# Patient Record
Sex: FEMALE | Race: WHITE | NOT HISPANIC OR LATINO | Employment: OTHER | URBAN - METROPOLITAN AREA
[De-identification: names, ages, dates, MRNs, and addresses within clinical notes are randomized per-mention and may not be internally consistent; named-entity substitution may affect disease eponyms.]

---

## 2017-01-04 ENCOUNTER — OFFICE VISIT (OUTPATIENT)
Dept: CARDIOLOGY | Facility: CLINIC | Age: 77
End: 2017-01-04

## 2017-01-04 VITALS
HEIGHT: 62 IN | DIASTOLIC BLOOD PRESSURE: 72 MMHG | HEART RATE: 50 BPM | BODY MASS INDEX: 33.31 KG/M2 | WEIGHT: 181 LBS | SYSTOLIC BLOOD PRESSURE: 148 MMHG

## 2017-01-04 DIAGNOSIS — R06.09 EXERTIONAL DYSPNEA: Primary | ICD-10-CM

## 2017-01-04 DIAGNOSIS — E78.5 HYPERLIPIDEMIA LDL GOAL <100: ICD-10-CM

## 2017-01-04 DIAGNOSIS — I10 ESSENTIAL HYPERTENSION: ICD-10-CM

## 2017-01-04 PROBLEM — R00.1 BRADYCARDIA: Status: ACTIVE | Noted: 2017-01-04

## 2017-01-04 PROCEDURE — 99213 OFFICE O/P EST LOW 20 MIN: CPT | Performed by: INTERNAL MEDICINE

## 2017-01-04 PROCEDURE — 93000 ELECTROCARDIOGRAM COMPLETE: CPT | Performed by: INTERNAL MEDICINE

## 2017-01-04 NOTE — PROGRESS NOTES
"Charo HURTADO Jose  1940  [phone]      01/04/2017    KURT Shaw MD    Chief Complaint   Patient presents with   • Hypertension   • Shortness of Breath     Problem List:   1. Exertional Dyspnea  1. Echocardiogram, 10/13/2016: Estimated EF = 60%. Mild aortic valve regurgitation is present. AV sclerosis no stenosis. Mild mitral valve regurgitation is present. RVSP(TR) 22.6 mmHg.   2. Gastroesophageal Reflux Disease Without Esophagitis  3. Essential Hypertension  4. Hyperlipidemia Ldl Goal <100  5. Surgeries:  1. Right cataract excision      Allergies   Allergen Reactions   • Aspirin        Current Medications:      Current Outpatient Prescriptions:   •  atorvastatin (LIPITOR) 20 MG tablet, Take 20 mg by mouth Daily., Disp: , Rfl:   •  Calcium Carbonate (CALCIUM 600 PO), Take  by mouth 2 (Two) Times a Day., Disp: , Rfl:   •  conjugated estrogens (PREMARIN) 0.625 MG/GM vaginal cream, Insert  into the vagina 2 (Two) Times a Week., Disp: , Rfl:   •  omeprazole (PriLOSEC) 20 MG capsule, Take 20 mg by mouth Daily., Disp: , Rfl:   •  polyethylene glycol (MIRALAX) packet, Take 17 g by mouth Daily. 1/2 capful, Disp: , Rfl:   •  talc (ZEASORB) powder, Apply  topically As Needed for irritation., Disp: , Rfl:   •  verapamil SR (CALAN-SR) 240 MG CR tablet, 2 (Two) Times a Day., Disp: , Rfl:     HPI    Patient is a 76 year old  female who presents today for follow up for her shortness of breath. Since last visit, she has not noticed any change in her symptoms; however, she did not ever start the Maxzide because she's afraid it will \"keep her home\" too much in the bathroom.  Her biggest concern is that of her heart rate gradually getting lower over time.  She had been at 62 for several years, 3 months ago she was at 56, and today her heart rate was at 50.  She admits she does not feel any differently, but feels this should be addressed accordingly before it gets any lower. She does admit to bilateral lower extremity " "edema that gets worse as the day progresses, but resolves over night.  Patient denies chest pain, palpitations, shortness of breath, PND, orthopnea, dizziness, and syncope. She is able to carry loads of laundry up and down the stairs without becoming symptomatic, but has not real exercise regimen.     The following portions of the patient's history were reviewed and updated as appropriate: allergies, current medications and problem list.    Pertinent positives as listed in the HPI.  All other systems reviewed are negative.    Vitals:    01/04/17 1029   BP: 148/72   BP Location: Right arm   Patient Position: Sitting   Pulse: 50   Weight: 181 lb (82.1 kg)   Height: 62\" (157.5 cm)       General: Alert and oriented  Neck: Jugular venous pressure is within normal limits. Carotids have normal upstrokes without bruits.   Cardiovascular: Heart has a nondisplaced focal PMI. Regular rate and rhythm without murmur, gallop or rub.  Lungs: Clear without rales or wheezes. Equal expansion is noted.   Extremities: Show no edema.  Skin: warm and dry.  Neurologic: nonfocal      Diagnostic Data:          ECG 12 Lead  Date/Time: 1/4/2017 11:10 AM  Performed by: ADDIE DOWNEY  Authorized by: ADDIE DOWNEY   Rhythm: sinus bradycardia  Rate: bradycardic  BPM: 53  Clinical impression: abnormal ECG  Comments: Cannot rule out anterior infarct, age undetermined            Assessment:      ICD-10-CM ICD-9-CM   1. Exertional dyspnea R06.09 786.09   2. Essential hypertension I10 401.9   3. Hyperlipidemia LDL goal <100 E78.5 272.4       Plan:    1. Start taking Maxzide 1/2 tab prn for edema.  If she should start taking it on a regular basis, she was instructed to call to get a lab order for a BMP.  She verbalized understanding  2. Cut Verapamil to daily dose instead of BID to see if HR improves.   3. Call in 2 weeks with BP and HR readings.    4. Continue current medications.  5. F/up in 3 months or sooner if needed.    Scribed for Sarah HICKS" MD Rena by Sherrie Suarez, LAVELL. 1/4/2017  10:44 AM        I, Sarah Chase MD, personally performed the services described in this documentation as scribed by the above named individual in my presence, and it is both accurate and complete.  1/4/2017  4:17 PM

## 2017-01-04 NOTE — MR AVS SNAPSHOT
Charo R Jose   1/4/2017 10:30 AM   Office Visit    Dept Phone:  [phone]   Encounter #:  [id number]    Provider:  Sarah Chase MD   Department:  [facility] CARDIOLOGY                Your Full Care Plan              Today's Medication Changes          These changes are accurate as of: 1/4/17 11:09 AM.  If you have any questions, ask your nurse or doctor.               Stop taking medication(s)listed here:     triamterene-hydrochlorothiazide 37.5-25 MG per tablet   Commonly known as:  MAXZIDE-25   Stopped by:  Sarah Chase MD                      Your Updated Medication List          This list is accurate as of: 1/4/17 11:09 AM.  Always use your most recent med list.                atorvastatin 20 MG tablet   Commonly known as:  LIPITOR       CALCIUM 600 PO       omeprazole 20 MG capsule   Commonly known as:  priLOSEC       polyethylene glycol packet   Commonly known as:  MIRALAX       PREMARIN 0.625 MG/GM vaginal cream   Generic drug:  conjugated estrogens       verapamil  MG CR tablet   Commonly known as:  CALAN-SR       ZEASORB powder   Generic drug:  talc               You Were Diagnosed With        Codes Comments    Exertional dyspnea    -  Primary ICD-10-CM: R06.09  ICD-9-CM: 786.09     Essential hypertension     ICD-10-CM: I10  ICD-9-CM: 401.9     Hyperlipidemia LDL goal <100     ICD-10-CM: E78.5  ICD-9-CM: 272.4       Instructions     None    Patient Instructions History      Upcoming Appointments     Visit Type Date Time Department    FOLLOW UP 1/4/2017 10:30 AM [facility]    FOLLOW UP 4/27/2017  4:15 PM [facility]      MyChart Signup     [facility] [employer] allows you to send messages to your doctor, view your test results, renew your prescriptions, schedule appointments, and more. To sign up, go to scroll kit and click on the Sign Up Now link in the New User? box. Enter your [employer]  "Activation Code exactly as it appears below along with the last four digits of your Social Security Number and your Date of Birth () to complete the sign-up process. If you do not sign up before the expiration date, you must request a new code.    [employer] Activation Code: [email or web address]  Expires: 2017 11:08 AM    If you have questions, you can email [email or web address] or call [phone] to talk to our [employer] staff. Remember, [employer] is NOT to be used for urgent needs. For medical emergencies, dial 911.               Other Info from Your Visit           Your Appointments     2017  4:15 PM EDT   Follow Up with Sarah Chase MD   [facility] MEDICAL [facility] CARDIOLOGY (--)    [address] [place] [id number] [phone]           Arrive 15 minutes prior to appointment.              Allergies     Aspirin        Reason for Visit     Hypertension     Shortness of Breath           Vital Signs     Blood Pressure Pulse Height Weight Body Mass Index Smoking Status    148/72 (BP Location: Right arm, Patient Position: Sitting) 50 62\" (157.5 cm) 181 lb (82.1 kg) 33.11 kg/m2 Never Smoker      Problems and Diagnoses Noted     Bradycardia    High blood pressure    Exertional dyspnea    Hyperlipidemia LDL goal <100        "

## 2017-01-19 ENCOUNTER — TELEPHONE (OUTPATIENT)
Dept: CARDIOLOGY | Facility: CLINIC | Age: 77
End: 2017-01-19

## 2017-01-20 DIAGNOSIS — Z79.899 HIGH RISK MEDICATION USE: Primary | ICD-10-CM

## 2017-01-20 NOTE — TELEPHONE ENCOUNTER
I spoke with the PT in regards to DR. Chase suggestion on monitoring BP and HR- she verbalized understanding- she will call me in 1-2 weeks with an update- she will me back sooner if she has any problems in the next week while on her trip-

## 2017-01-31 ENCOUNTER — LAB (OUTPATIENT)
Dept: LAB | Facility: HOSPITAL | Age: 77
End: 2017-01-31

## 2017-01-31 DIAGNOSIS — Z79.899 HIGH RISK MEDICATION USE: ICD-10-CM

## 2017-01-31 LAB
ANION GAP SERPL CALCULATED.3IONS-SCNC: 3 MMOL/L (ref 3–11)
BUN BLD-MCNC: 19 MG/DL (ref 9–23)
BUN/CREAT SERPL: 21.1 (ref 7–25)
CALCIUM SPEC-SCNC: 11.2 MG/DL (ref 8.7–10.4)
CHLORIDE SERPL-SCNC: 105 MMOL/L (ref 99–109)
CO2 SERPL-SCNC: 33 MMOL/L (ref 20–31)
CREAT BLD-MCNC: 0.9 MG/DL (ref 0.6–1.3)
GFR SERPL CREATININE-BSD FRML MDRD: 61 ML/MIN/1.73
GLUCOSE BLD-MCNC: 97 MG/DL (ref 70–100)
POTASSIUM BLD-SCNC: 4.5 MMOL/L (ref 3.5–5.5)
SODIUM BLD-SCNC: 141 MMOL/L (ref 132–146)

## 2017-01-31 PROCEDURE — 80048 BASIC METABOLIC PNL TOTAL CA: CPT | Performed by: INTERNAL MEDICINE

## 2017-02-01 NOTE — TELEPHONE ENCOUNTER
The patient called to report the requested BP log.  The majority of the time, both morning and evening, BP is between 120-138/62-79 with HR ranging from 62-75.  However, in the evening on 4 occasions her BP has elevated to 150-160/68-90 with HR in the 40's.  She also states that the prn 0.5 tablet of triamterene in the morning has significantly improved her edema, but in the evenings she is noticing the edema is sometimes returning.  I advised that she take the other half of her triamterene in the evening for edema or SBP >150.  She verbalized understanding and states she will let us know if she has had any further questions or concerns.

## 2017-04-27 ENCOUNTER — OFFICE VISIT (OUTPATIENT)
Dept: CARDIOLOGY | Facility: CLINIC | Age: 77
End: 2017-04-27

## 2017-04-27 VITALS
DIASTOLIC BLOOD PRESSURE: 68 MMHG | HEART RATE: 78 BPM | HEIGHT: 62 IN | SYSTOLIC BLOOD PRESSURE: 130 MMHG | WEIGHT: 181.2 LBS | BODY MASS INDEX: 33.34 KG/M2

## 2017-04-27 DIAGNOSIS — I10 ESSENTIAL HYPERTENSION: ICD-10-CM

## 2017-04-27 DIAGNOSIS — R06.09 EXERTIONAL DYSPNEA: ICD-10-CM

## 2017-04-27 DIAGNOSIS — E78.5 HYPERLIPIDEMIA LDL GOAL <100: ICD-10-CM

## 2017-04-27 DIAGNOSIS — R00.1 BRADYCARDIA: Primary | ICD-10-CM

## 2017-04-27 PROCEDURE — 99213 OFFICE O/P EST LOW 20 MIN: CPT | Performed by: NURSE PRACTITIONER

## 2017-04-27 RX ORDER — CALCIUM CARBONATE 200(500)MG
2 TABLET,CHEWABLE ORAL DAILY
COMMUNITY
End: 2020-06-24

## 2017-04-27 RX ORDER — PANTOPRAZOLE SODIUM 40 MG/1
40 TABLET, DELAYED RELEASE ORAL DAILY
COMMUNITY

## 2017-04-27 RX ORDER — HYDROCHLOROTHIAZIDE 12.5 MG/1
12.5 TABLET ORAL DAILY
COMMUNITY
End: 2017-11-13

## 2017-04-27 NOTE — PROGRESS NOTES
Charo Garcia  1940  [phone]      04/27/2017    KURT Shaw MD    Chief Complaint:  Dyspnea and hypertension, edema      Allergies   Allergen Reactions   • Aspirin        Current Medications:      Current Outpatient Prescriptions:   •  atorvastatin (LIPITOR) 20 MG tablet, Take 20 mg by mouth Daily., Disp: , Rfl:   •  calcium carbonate (TUMS) 500 MG chewable tablet, Chew 2 tablets Daily., Disp: , Rfl:   •  conjugated estrogens (PREMARIN) 0.625 MG/GM vaginal cream, Insert  into the vagina 2 (Two) Times a Week., Disp: , Rfl:   •  hydrochlorothiazide (HYDRODIURIL) 12.5 MG tablet, Take 12.5 mg by mouth Daily., Disp: , Rfl:   •  pantoprazole (PROTONIX) 40 MG EC tablet, Take 40 mg by mouth 2 (Two) Times a Day., Disp: , Rfl:   •  polyethylene glycol (MIRALAX) packet, Take 17 g by mouth Daily. 1/2 capful, Disp: , Rfl:   •  talc (ZEASORB) powder, Apply  topically As Needed for irritation., Disp: , Rfl:   •  verapamil SR (CALAN-SR) 240 MG CR tablet, Take 240 mg by mouth Every Night., Disp: , Rfl:     HPI    Patient is a pleasant 76 year old female who presents today for follow up of her hypertension and edema. Since last visit, she has been feeling much better. Her bilateral lower extremity edema is much better after the addition of Maxzide (1/2 tab) as needed.  She states her breathing has improved and she feels that she is able to be more active now than previously.  She also states that her heart rate is staying in the 50-60's range, as opposed the to 40-50's, since decreasing her Verapamil to daily dosing.  She walks several times per week for varied durations and states she does not become symptomatic with any of this.  She denies having any current chest pain, palpitations, shortness of breath, edema, PND, orthopnea, dizziness, and syncope.     The following portions of the patient's history were reviewed and updated as appropriate: allergies, current medications and problem list.    Pertinent positives as  "listed in the HPI.  All other systems reviewed are negative.    Vitals:    04/27/17 1620   BP: 130/68   BP Location: Right arm   Patient Position: Sitting   Pulse: 78   Weight: 181 lb 3.2 oz (82.2 kg)   Height: 62\" (157.5 cm)       Physical Exam:  GENERAL: well-developed, well-nourished; in no acute distress.   NECK:  Carotid upstrokes are 2+ and  symmetrical without bruits.   LUNGS: Clear to auscultation bilaterally without wheezing, rhonchi, or rales noted.   CARDIOVASCULAR: The heart has a regular rate with a normal S1 and S2. There is no murmur, gallop, rub, or click appreciated. The PMI is nondisplaced.   ABDOMEN: Soft and nontender  NEUROLOGICAL: Nonfocal; Alert and oriented  PERIPHERAL VASCULAR:  Posterior tibial and dorsalis pedis pulses are 2+ and symmetrical. There is no peripheral edema.     Diagnostic Data:    Procedures    Assessment:      ICD-10-CM ICD-9-CM   1. Bradycardia R00.1 427.89   2. Essential hypertension I10 401.9   3. Hyperlipidemia LDL goal <100 E78.5 272.4   4. Exertional dyspnea R06.09 786.09       Plan:    1. May take a full tab of Maxzide as needed;  If she takes more than 2 consecutive days, then she knows to call and have potassium levels checked  2. Continue current medications.  3. F/up in 12 months or sooner if needed.    Seen independently by LAVELL Diop on April 27, 2017 @ 1640        "

## 2017-04-28 ENCOUNTER — TRANSCRIBE ORDERS (OUTPATIENT)
Dept: ADMINISTRATIVE | Facility: HOSPITAL | Age: 77
End: 2017-04-28

## 2017-04-28 DIAGNOSIS — Z12.31 VISIT FOR SCREENING MAMMOGRAM: Primary | ICD-10-CM

## 2017-06-15 ENCOUNTER — APPOINTMENT (OUTPATIENT)
Dept: MAMMOGRAPHY | Facility: HOSPITAL | Age: 77
End: 2017-06-15
Attending: FAMILY MEDICINE

## 2017-06-21 ENCOUNTER — HOSPITAL ENCOUNTER (OUTPATIENT)
Dept: MAMMOGRAPHY | Facility: HOSPITAL | Age: 77
Discharge: HOME | End: 2017-06-21
Attending: FAMILY MEDICINE | Admitting: OBSTETRICS & GYNECOLOGY

## 2017-06-21 DIAGNOSIS — Z12.31 VISIT FOR SCREENING MAMMOGRAM: ICD-10-CM

## 2017-06-21 PROCEDURE — 77063 BREAST TOMOSYNTHESIS BI: CPT

## 2017-06-21 PROCEDURE — 77063 BREAST TOMOSYNTHESIS BI: CPT | Performed by: RADIOLOGY

## 2017-06-21 PROCEDURE — G0202 SCR MAMMO BI INCL CAD: HCPCS | Performed by: RADIOLOGY

## 2017-06-21 PROCEDURE — G0202 SCR MAMMO BI INCL CAD: HCPCS

## 2017-11-13 RX ORDER — TRIAMTERENE AND HYDROCHLOROTHIAZIDE 37.5; 25 MG/1; MG/1
TABLET ORAL
Qty: 45 TABLET | Refills: 1 | Status: SHIPPED | OUTPATIENT
Start: 2017-11-13 | End: 2018-03-22 | Stop reason: SDUPTHER

## 2018-03-23 RX ORDER — TRIAMTERENE AND HYDROCHLOROTHIAZIDE 37.5; 25 MG/1; MG/1
TABLET ORAL
Qty: 45 TABLET | Refills: 1 | Status: SHIPPED | OUTPATIENT
Start: 2018-03-23 | End: 2018-05-23 | Stop reason: SDUPTHER

## 2018-05-02 ENCOUNTER — OFFICE VISIT (OUTPATIENT)
Dept: CARDIOLOGY | Facility: CLINIC | Age: 78
End: 2018-05-02

## 2018-05-02 VITALS
WEIGHT: 173 LBS | DIASTOLIC BLOOD PRESSURE: 64 MMHG | HEART RATE: 60 BPM | BODY MASS INDEX: 31.83 KG/M2 | HEIGHT: 62 IN | SYSTOLIC BLOOD PRESSURE: 130 MMHG

## 2018-05-02 DIAGNOSIS — I10 ESSENTIAL HYPERTENSION: Primary | ICD-10-CM

## 2018-05-02 DIAGNOSIS — R00.1 BRADYCARDIA: ICD-10-CM

## 2018-05-02 DIAGNOSIS — R60.0 LOCALIZED EDEMA: ICD-10-CM

## 2018-05-02 DIAGNOSIS — E78.5 HYPERLIPIDEMIA LDL GOAL <100: ICD-10-CM

## 2018-05-02 PROCEDURE — 99213 OFFICE O/P EST LOW 20 MIN: CPT | Performed by: INTERNAL MEDICINE

## 2018-05-02 RX ORDER — SILDENAFIL 50 MG/1
TABLET, FILM COATED ORAL
Qty: 20 TABLET | Refills: 0 | Status: SHIPPED | OUTPATIENT
Start: 2018-05-02 | End: 2018-05-10

## 2018-05-02 RX ORDER — HYDROCHLOROTHIAZIDE 12.5 MG/1
12.5 TABLET ORAL DAILY
COMMUNITY
End: 2018-05-02

## 2018-05-02 RX ORDER — CLOBETASOL PROPIONATE 0.5 MG/G
1 CREAM TOPICAL WEEKLY
COMMUNITY

## 2018-05-02 RX ORDER — PHENOL 1.4 %
600 AEROSOL, SPRAY (ML) MUCOUS MEMBRANE 2 TIMES DAILY
COMMUNITY

## 2018-05-02 NOTE — PROGRESS NOTES
"Charo Garcia  1940  [phone]      05/02/2018    KURT Shaw MD    Chief Complaint   Patient presents with   • Slow Heart Rate   • Hypertension     Problem List:  1. Exertional dyspnea  a. Cardiovascular study 3/10/2015: Abnormal limited exercise stress test with borderline ischemic EKG changes and transient chest \"tightness\" at peak exercise and occasional atrial ectopy with isolated systolic and systemic arterial hypertension at rest and with exercise with acceptable heart rate decrement post exercise to 101% of predicted maximum heart rate and only 82% of predicted exercise capacity with suggestions of physical deconditioning with abnormal Duke treadmill score. Acceptable quantitative SPECT gated Cardiolite images without findings of myocardial ischemia/scar by scintigraphic criteria with overall acceptable left ventricular chamber size, systolic wall motion, calculated global left ventricular ejection fraction (0.72).  Overall,  the current study suggests possible hypertensive cardiovascular disease with physical deconditioning with preserved systolic left ventricular function and overall low probability for significant focal obstructive coronary disease with clinical correlation recommended.   b. Echocardiogram, 10/13/2016: Estimated EF = 60%. Mild aortic valve regurgitation is present. AV sclerosis no stenosis. Mild mitral valve regurgitation is present. RVSP(TR) 22.6 mmHg.   2. Bradycardia   3. Essential hypertension  4. Hyperlipidemia LDL goal < 100  5. GERD without esophagitis  6. Surgeries  a. Right cataract excision    Allergies   Allergen Reactions   • Aspirin        Current Medications:      Current Outpatient Prescriptions:   •  atorvastatin (LIPITOR) 20 MG tablet, Take 20 mg by mouth Daily., Disp: , Rfl:   •  calcium carbonate (OS-SHARONA) 600 MG tablet, Take 600 mg by mouth 2 (Two) Times a Day., Disp: , Rfl:   •  calcium carbonate (TUMS) 500 MG chewable tablet, Chew 2 tablets Daily., Disp: , " "Rfl:   •  clobetasol (TEMOVATE) 0.05 % cream, Apply 1 application topically 1 (One) Time Per Week., Disp: , Rfl:   •  conjugated estrogens (PREMARIN) 0.625 MG/GM vaginal cream, Insert  into the vagina 2 (Two) Times a Week., Disp: , Rfl:   •  pantoprazole (PROTONIX) 40 MG EC tablet, Take 40 mg by mouth 2 (Two) Times a Day., Disp: , Rfl:   •  polyethylene glycol (MIRALAX) packet, Take 17 g by mouth Daily. 1/2 capful, Disp: , Rfl:   •  talc (ZEASORB) powder, Apply  topically As Needed for irritation., Disp: , Rfl:   •  triamterene-hydrochlorothiazide (MAXZIDE-25) 37.5-25 MG per tablet, TAKE ONE-HALF TABLET BY MOUTH ONCE DAILY. MAY TAKE AN EXTRA ONE-HALF TABLET IF NEEDED, Disp: 45 tablet, Rfl: 1  •  verapamil SR (CALAN-SR) 240 MG CR tablet, Take 240 mg by mouth Every Night., Disp: , Rfl:     HPI    Charo Garcia presents today for 12 month follow up of exertional dyspnea, hypertension and hyperlipidemia. Since last visit, patient has been doing well from a cardiac standpoint. Monitors her blood pressure at home with readings usally around 140 systolic. Her lower extremity edema has improved since her last visit. Cholesterol and CMP is monitored by Dr. Elena. Patient denies chest pain, palpitations, shortness of breath, edema, PND, orthopnea, dizziness, and syncope. Is limited in her activity by shortness of breath.    The following portions of the patient's history were reviewed and updated as appropriate: allergies, current medications and problem list.    Pertinent positives as listed in the HPI.  All other systems reviewed are negative.    Vitals:    05/02/18 1319   BP: 130/64   BP Location: Right arm   Patient Position: Sitting   Pulse: 60   Weight: 78.5 kg (173 lb)   Height: 157.5 cm (62\")       Physical Exam:  General: Alert and oriented to person, place, and time.  Neck: Jugular venous pressure is within normal limits. Carotids have normal upstrokes without bruits.   Cardiovascular: Regular rate and rhythm without " murmur gallop or rub.  Lungs: Clear without rales or wheezes. Equal expansion is noted.   Extremities: Show no edema.  Skin: warm and dry.  Neurologic: nonfocal    Diagnostic Data:    Lab Results   Component Value Date    GLUCOSE 97 01/31/2017    CALCIUM 11.2 (H) 01/31/2017     01/31/2017    K 4.5 01/31/2017    CO2 33.0 (H) 01/31/2017     01/31/2017    BUN 19 01/31/2017    CREATININE 0.90 01/31/2017    EGFRIFNONA 61 01/31/2017    BCR 21.1 01/31/2017    ANIONGAP 3.0 01/31/2017     Procedures    Assessment:      ICD-10-CM ICD-9-CM   1. Essential hypertension I10 401.9   2. Localized edema R60.0 782.3   3. Bradycardia R00.1 427.89   4. Hyperlipidemia LDL goal <100 E78.5 272.4       Plan:    1. Cut verapamil to 240 mg daily in half to 120 mg BID for two weeks due to her blood pressure being increased in the evenings, and monitor blood pressure. Will come back in 3 weeks for nurse assessment.  2. Continue current medications.  3. F/up in 6 month or sooner if needed.    Scribed for Sarah Chase MD by Ortega Joyce. 5/2/2018  1:56 PM     I Sarah Chase MD personally performed the services described in this documentation as scribed by the above individual in my presence, and it is both accurate and complete.    Sarah Chase MD, FACC

## 2018-05-23 ENCOUNTER — DOCUMENTATION (OUTPATIENT)
Dept: CARDIOLOGY | Facility: CLINIC | Age: 78
End: 2018-05-23

## 2018-05-23 RX ORDER — TRIAMTERENE AND HYDROCHLOROTHIAZIDE 37.5; 25 MG/1; MG/1
TABLET ORAL
Qty: 45 TABLET | Refills: 1 | Status: SHIPPED | OUTPATIENT
Start: 2018-05-23 | End: 2018-09-24 | Stop reason: SDUPTHER

## 2018-05-23 NOTE — PROGRESS NOTES
PT in for BP check this morning- her machine showed 145/72 manually 140/70- she split her Verapamil to 120 mg BID and her BP is better covered-     Will continue to take Verapamil 120 mg BID-

## 2018-06-21 ENCOUNTER — TRANSCRIBE ORDERS (OUTPATIENT)
Dept: ADMINISTRATIVE | Facility: HOSPITAL | Age: 78
End: 2018-06-21

## 2018-06-21 DIAGNOSIS — Z12.31 VISIT FOR SCREENING MAMMOGRAM: Primary | ICD-10-CM

## 2018-06-22 ENCOUNTER — HOSPITAL ENCOUNTER (OUTPATIENT)
Dept: MAMMOGRAPHY | Facility: HOSPITAL | Age: 78
Discharge: HOME | End: 2018-06-22
Attending: OBSTETRICS & GYNECOLOGY | Admitting: OBSTETRICS & GYNECOLOGY

## 2018-06-22 DIAGNOSIS — Z12.31 VISIT FOR SCREENING MAMMOGRAM: ICD-10-CM

## 2018-06-22 PROCEDURE — 77067 SCR MAMMO BI INCL CAD: CPT

## 2018-06-22 PROCEDURE — 77063 BREAST TOMOSYNTHESIS BI: CPT

## 2018-06-22 PROCEDURE — 77063 BREAST TOMOSYNTHESIS BI: CPT | Performed by: RADIOLOGY

## 2018-06-22 PROCEDURE — 77067 SCR MAMMO BI INCL CAD: CPT | Performed by: RADIOLOGY

## 2018-09-24 RX ORDER — TRIAMTERENE AND HYDROCHLOROTHIAZIDE 37.5; 25 MG/1; MG/1
TABLET ORAL
Qty: 90 TABLET | Refills: 0 | Status: SHIPPED | OUTPATIENT
Start: 2018-09-24 | End: 2019-03-13 | Stop reason: SDUPTHER

## 2019-03-14 RX ORDER — TRIAMTERENE AND HYDROCHLOROTHIAZIDE 37.5; 25 MG/1; MG/1
TABLET ORAL
Qty: 90 TABLET | Refills: 0 | Status: SHIPPED | OUTPATIENT
Start: 2019-03-14 | End: 2019-10-22 | Stop reason: SDUPTHER

## 2019-05-10 ENCOUNTER — TRANSCRIBE ORDERS (OUTPATIENT)
Dept: ADMINISTRATIVE | Facility: HOSPITAL | Age: 79
End: 2019-05-10

## 2019-05-10 DIAGNOSIS — Z12.31 VISIT FOR SCREENING MAMMOGRAM: Primary | ICD-10-CM

## 2019-06-05 ENCOUNTER — OFFICE VISIT (OUTPATIENT)
Dept: CARDIOLOGY | Facility: CLINIC | Age: 79
End: 2019-06-05

## 2019-06-05 VITALS
WEIGHT: 177 LBS | HEART RATE: 70 BPM | HEIGHT: 62 IN | DIASTOLIC BLOOD PRESSURE: 70 MMHG | BODY MASS INDEX: 32.57 KG/M2 | SYSTOLIC BLOOD PRESSURE: 136 MMHG

## 2019-06-05 DIAGNOSIS — I10 ESSENTIAL HYPERTENSION: ICD-10-CM

## 2019-06-05 DIAGNOSIS — R01.1 HEART MURMUR: ICD-10-CM

## 2019-06-05 DIAGNOSIS — R06.09 EXERTIONAL DYSPNEA: Primary | ICD-10-CM

## 2019-06-05 DIAGNOSIS — E78.5 HYPERLIPIDEMIA LDL GOAL <100: ICD-10-CM

## 2019-06-05 DIAGNOSIS — R00.1 BRADYCARDIA: ICD-10-CM

## 2019-06-05 DIAGNOSIS — I35.8 AORTIC VALVE SCLEROSIS: ICD-10-CM

## 2019-06-05 PROCEDURE — 99214 OFFICE O/P EST MOD 30 MIN: CPT | Performed by: INTERNAL MEDICINE

## 2019-06-05 NOTE — PROGRESS NOTES
"Charo Garcia  1940  79 y.o.  [phone]  [phone]      Date: 06/05/2019    PCP: LEVON Shaw MD    Chief Complaint   Patient presents with   • Essential hypertension       Problem List:  1. Exertional Dyspnea  a. Cardiovascular study 3/10/2015: Abnormal limited exercise stress test with borderline ischemic EKG changes and transient chest \"tightness\" at peak exercise and occasional atrial ectopy with isolated systolic and systemic arterial hypertension at rest and with exercise with acceptable heart rate decrement post exercise to 101% of predicted maximum heart rate and only 82% of predicted exercise capacity with suggestions of physical deconditioning with abnormal Duke treadmill score. Acceptable quantitative SPECT gated Cardiolite images without findings of myocardial ischemia/scar by scintigraphic criteria with overall acceptable left ventricular chamber size, systolic wall motion, calculated global left ventricular ejection fraction (0.72).  Overall,  the current study suggests possible hypertensive cardiovascular disease with physical deconditioning with preserved systolic left ventricular function and overall low probability for significant focal obstructive coronary disease with clinical correlation recommended.   b. Echo (10/13/2016): EF = 60%. Mild aortic valve regurgitation is present. AV sclerosis no stenosis. Mild mitral valve regurgitation is present. RVSP(TR) 22.6 mmHg.   2. Bradycardia  3. Essential Hypertension  4. Hyperlipidemia. LDL goal < 100  5. Localized Edema  a. Lower Extremity, seen on 9/29/2016  6. GERD without esophagitis  7. Surgeries  a. Right cataract excision    Allergies   Allergen Reactions   • Aspirin        Current Medications:      Current Outpatient Medications:   •  atorvastatin (LIPITOR) 20 MG tablet, Take 20 mg by mouth Daily., Disp: , Rfl:   •  calcium carbonate (OS-SHARONA) 600 MG tablet, Take 600 mg by mouth 2 (Two) Times a Day., Disp: , Rfl:   •  calcium " carbonate (TUMS) 500 MG chewable tablet, Chew 2 tablets Daily., Disp: , Rfl:   •  clobetasol (TEMOVATE) 0.05 % cream, Apply 1 application topically 1 (One) Time Per Week., Disp: , Rfl:   •  conjugated estrogens (PREMARIN) 0.625 MG/GM vaginal cream, Insert  into the vagina 2 (Two) Times a Week., Disp: , Rfl:   •  pantoprazole (PROTONIX) 40 MG EC tablet, Take 40 mg by mouth Daily., Disp: , Rfl:   •  polyethylene glycol (MIRALAX) packet, Take 17 g by mouth Daily. 1/2 capful, Disp: , Rfl:   •  talc (ZEASORB) powder, Apply  topically As Needed for irritation., Disp: , Rfl:   •  triamterene-hydrochlorothiazide (MAXZIDE-25) 37.5-25 MG per tablet, TAKE 1/2 TO 1 TABLET DAILY AS DIRECTED, Disp: 90 tablet, Rfl: 0  •  verapamil SR (CALAN-SR) 120 MG CR tablet, Take 120 mg by mouth 2 (Two) Times a Day., Disp: , Rfl:     HPI    Charo Garcia is a 79 y.o. female who presents today for a 6 month follow up of exertional dyspnea, bradycardia, essential hypertension, and hyperlipidemia. Since last visit, she has been doing well since her last visit. She notes that she has exertional shortness of breath when she walks up a hill, but denies any exertional dyspnea when walking up the stairs at home. She is upset with the rescheduling her appointment from November 2018 to June 2019. She notes that when she was vacationing in the past two weeks, she has not been regularly checking her blood pressure. She notes that she has been feeling better since her last visit in regards to her blood pressure at night splitting the pill in half and taking each half 12 hours apart.     She notes that she has not gotten significant exercise since her last visit. She notes that she [place] the [place] and [place] a membership to begin a routine exercise schedule. Patient denies chest pain, palpitations, shortness of breath, edema, dizziness, and syncope.     The following portions of the patient's history were reviewed and updated as  "appropriate: allergies, current medications and problem list.    Pertinent positives as listed in the HPI.  All other systems reviewed are negative.    Vitals:    06/05/19 1308   BP: 136/70   BP Location: Right arm   Patient Position: Sitting   Pulse: 70   Weight: 80.3 kg (177 lb)   Height: 157.5 cm (62\")       Physical Exam:    General: Alert and oriented.  Neck: Jugular venous pressure is within normal limits. Carotids have normal upstrokes without bruits.   Cardiovascular: Heart has a nondisplaced focal PMI. Regular rate and rhythm without gallop or rub. 2/6 SE murmur.    Lungs: Clear without rales or wheezes. Equal expansion is noted.   Extremities: Mild edema of the lower extremity.   Skin: Warm and dry.  Neurologic: Nonfocal.    Diagnostic Data:    Lipid Panel (9/13/2018):  ·   · TG 90  · HDL 60.2  · LDL 69.4    CMP (9/13/2018):  · GLU 95  · BUN 25  · CREAT 1.17  ·   · K 4.1  · AST 16  · ALT 15    No results found for: WBC, RBC, HGB, HCT, MCV, MCH, MCHC, RDW, RDWSD, MPV, PLT, NEUTRORELPCT, LYMPHORELPCT, MONORELPCT, EOSRELPCT, BASORELPCT, AUTOIGPER, NEUTROABS, LYMPHSABS, MONOSABS, EOSABS, BASOSABS, AUTOIGNUM, NRBC     No results found for: TSH     No results found for: HGB    Procedures    Assessment:      ICD-10-CM ICD-9-CM   1. Exertional dyspnea R06.09 786.09   2. Bradycardia R00.1 427.89   3. Essential hypertension I10 401.9   4. Hyperlipidemia LDL goal <100 E78.5 272.4     Lab results found above were reviewed with the patient.    Plan:      1. Counseled the patient in regards to extended waiting times with rescheduled appointments. Will provide the names of other providers in the practice if she would like to see another physician.   2. Recommended continuous aerobic exercises, especially if she joins the [employer], which would provide non-weight bearing cardiac exercise with strengthening of her back.   3. Order a repeat echo due to carotid murmur that was heard on physical exam today. "   4. Patient will continue to take half of her Maxide-25 pill as she has been taking it, despite minimal edema of the lower extremities.   5. Continue Verapamil  mg BID for angina and arrhythmia management.   6. Continue Atorvastatin 20 gm daily for hyperlipidemia management.   7. Continue all other current medications.  8. F/up in 12 months or sooner if needed.    Scribed for Sarah Chase MD by Loli Cruz. 6/5/2019  1:32 PM     I Sarah Chase MD personally performed the services described in this documentation as scribed by the above individual in my presence, and it is both accurate and complete.    Sarah Chase MD, FACC

## 2019-06-24 ENCOUNTER — HOSPITAL ENCOUNTER (OUTPATIENT)
Dept: MAMMOGRAPHY | Facility: HOSPITAL | Age: 79
Discharge: HOME | End: 2019-06-24
Admitting: OBSTETRICS & GYNECOLOGY

## 2019-06-24 DIAGNOSIS — Z12.31 VISIT FOR SCREENING MAMMOGRAM: ICD-10-CM

## 2019-06-24 PROCEDURE — 77063 BREAST TOMOSYNTHESIS BI: CPT | Performed by: RADIOLOGY

## 2019-06-24 PROCEDURE — 77067 SCR MAMMO BI INCL CAD: CPT | Performed by: RADIOLOGY

## 2019-06-24 PROCEDURE — 77063 BREAST TOMOSYNTHESIS BI: CPT

## 2019-06-24 PROCEDURE — 77067 SCR MAMMO BI INCL CAD: CPT

## 2019-07-08 ENCOUNTER — HOSPITAL ENCOUNTER (OUTPATIENT)
Dept: CARDIOLOGY | Facility: HOSPITAL | Age: 79
Discharge: HOME | End: 2019-07-08
Admitting: INTERNAL MEDICINE

## 2019-07-08 VITALS — HEIGHT: 62 IN | WEIGHT: 177 LBS | BODY MASS INDEX: 32.57 KG/M2

## 2019-07-08 DIAGNOSIS — I35.8 AORTIC VALVE SCLEROSIS: ICD-10-CM

## 2019-07-08 DIAGNOSIS — R01.1 HEART MURMUR: ICD-10-CM

## 2019-07-08 PROCEDURE — 93306 TTE W/DOPPLER COMPLETE: CPT

## 2019-07-08 PROCEDURE — 93306 TTE W/DOPPLER COMPLETE: CPT | Performed by: INTERNAL MEDICINE

## 2019-07-09 LAB
BH CV ECHO MEAS - AI DEC SLOPE: 192.5 CM/SEC^2
BH CV ECHO MEAS - AI MAX PG: 59.3 MMHG
BH CV ECHO MEAS - AI MAX VEL: 383.8 CM/SEC
BH CV ECHO MEAS - AI P1/2T: 583.9 MSEC
BH CV ECHO MEAS - AO MAX PG (FULL): 9.8 MMHG
BH CV ECHO MEAS - AO MAX PG: 12.5 MMHG
BH CV ECHO MEAS - AO MEAN PG (FULL): 5.7 MMHG
BH CV ECHO MEAS - AO MEAN PG: 7.2 MMHG
BH CV ECHO MEAS - AO V2 MAX: 176.5 CM/SEC
BH CV ECHO MEAS - AO V2 MEAN: 126.6 CM/SEC
BH CV ECHO MEAS - AO V2 VTI: 41.5 CM
BH CV ECHO MEAS - AVA(I,A): 1.6 CM^2
BH CV ECHO MEAS - AVA(I,D): 1.6 CM^2
BH CV ECHO MEAS - AVA(V,A): 1.5 CM^2
BH CV ECHO MEAS - AVA(V,D): 1.5 CM^2
BH CV ECHO MEAS - BSA(HAYCOCK): 1.9 M^2
BH CV ECHO MEAS - BSA: 1.8 M^2
BH CV ECHO MEAS - BZI_BMI: 32.4 KILOGRAMS/M^2
BH CV ECHO MEAS - BZI_METRIC_HEIGHT: 157.5 CM
BH CV ECHO MEAS - BZI_METRIC_WEIGHT: 80.3 KG
BH CV ECHO MEAS - EDV(CUBED): 65.9 ML
BH CV ECHO MEAS - EDV(TEICH): 71.7 ML
BH CV ECHO MEAS - EF(CUBED): 88.5 %
BH CV ECHO MEAS - EF(TEICH): 83 %
BH CV ECHO MEAS - ESV(CUBED): 7.6 ML
BH CV ECHO MEAS - ESV(TEICH): 12.2 ML
BH CV ECHO MEAS - FS: 51.4 %
BH CV ECHO MEAS - IVS/LVPW: 1
BH CV ECHO MEAS - IVSD: 1 CM
BH CV ECHO MEAS - LA DIMENSION: 3.4 CM
BH CV ECHO MEAS - LAD MAJOR: 4.6 CM
BH CV ECHO MEAS - LAT PEAK E' VEL: 6.5 CM/SEC
BH CV ECHO MEAS - LATERAL E/E' RATIO: 14.6
BH CV ECHO MEAS - LV MASS(C)D: 132.3 GRAMS
BH CV ECHO MEAS - LV MASS(C)DI: 72.9 GRAMS/M^2
BH CV ECHO MEAS - LV MAX PG: 2.7 MMHG
BH CV ECHO MEAS - LV MEAN PG: 1.5 MMHG
BH CV ECHO MEAS - LV V1 MAX: 81.9 CM/SEC
BH CV ECHO MEAS - LV V1 MEAN: 58.3 CM/SEC
BH CV ECHO MEAS - LV V1 VTI: 20.5 CM
BH CV ECHO MEAS - LVIDD: 4 CM
BH CV ECHO MEAS - LVIDS: 2 CM
BH CV ECHO MEAS - LVOT AREA (M): 3.1 CM^2
BH CV ECHO MEAS - LVOT AREA: 3.1 CM^2
BH CV ECHO MEAS - LVOT DIAM: 2 CM
BH CV ECHO MEAS - LVPWD: 1 CM
BH CV ECHO MEAS - MED PEAK E' VEL: 5.4 CM/SEC
BH CV ECHO MEAS - MEDIAL E/E' RATIO: 17.7
BH CV ECHO MEAS - MV A MAX VEL: 101.7 CM/SEC
BH CV ECHO MEAS - MV DEC TIME: 0.23 SEC
BH CV ECHO MEAS - MV E MAX VEL: 96.7 CM/SEC
BH CV ECHO MEAS - MV E/A: 0.95
BH CV ECHO MEAS - PA ACC SLOPE: 385.5 CM/SEC^2
BH CV ECHO MEAS - PA ACC TIME: 0.17 SEC
BH CV ECHO MEAS - PA MAX PG: 2 MMHG
BH CV ECHO MEAS - PA PR(ACCEL): 2.9 MMHG
BH CV ECHO MEAS - PA V2 MAX: 70.6 CM/SEC
BH CV ECHO MEAS - RAP SYSTOLE: 3 MMHG
BH CV ECHO MEAS - RVSP: 28 MMHG
BH CV ECHO MEAS - SI(CUBED): 32.1 ML/M^2
BH CV ECHO MEAS - SI(LVOT): 35.4 ML/M^2
BH CV ECHO MEAS - SI(TEICH): 32.8 ML/M^2
BH CV ECHO MEAS - SV(CUBED): 58.3 ML
BH CV ECHO MEAS - SV(LVOT): 64.3 ML
BH CV ECHO MEAS - SV(TEICH): 59.5 ML
BH CV ECHO MEAS - TAPSE (>1.6): 2.5 CM2
BH CV ECHO MEAS - TR MAX PG: 25 MMHG
BH CV ECHO MEAS - TR MAX VEL: 248.9 CM/SEC
BH CV ECHO MEASUREMENTS AVERAGE E/E' RATIO: 16.25
BH CV VAS BP RIGHT ARM: NORMAL MMHG
BH CV XLRA - RV BASE: 3.1 CM
BH CV XLRA - RV LENGTH: 6.8 CM
BH CV XLRA - RV MID: 2.7 CM
BH CV XLRA - TDI S': 13.9 CM/SEC
LEFT ATRIUM VOLUME INDEX: 19.3 ML/M^2
LEFT ATRIUM VOLUME: 35 ML
LV EF 2D ECHO EST: 60 %
MAXIMAL PREDICTED HEART RATE: 139 BPM
STRESS TARGET HR: 118 BPM

## 2019-10-22 RX ORDER — TRIAMTERENE AND HYDROCHLOROTHIAZIDE 37.5; 25 MG/1; MG/1
1 TABLET ORAL DAILY
Qty: 90 TABLET | Refills: 0 | Status: SHIPPED | OUTPATIENT
Start: 2019-10-22 | End: 2019-10-23 | Stop reason: SDUPTHER

## 2019-10-23 RX ORDER — TRIAMTERENE AND HYDROCHLOROTHIAZIDE 37.5; 25 MG/1; MG/1
1 TABLET ORAL DAILY
Qty: 90 TABLET | Refills: 0 | Status: SHIPPED | OUTPATIENT
Start: 2019-10-23 | End: 2020-03-20 | Stop reason: SDUPTHER

## 2020-03-20 RX ORDER — TRIAMTERENE AND HYDROCHLOROTHIAZIDE 37.5; 25 MG/1; MG/1
1 TABLET ORAL DAILY
Qty: 90 TABLET | Refills: 1 | Status: SHIPPED | OUTPATIENT
Start: 2020-03-20 | End: 2021-03-10

## 2020-06-24 ENCOUNTER — OFFICE VISIT (OUTPATIENT)
Dept: CARDIOLOGY | Facility: CLINIC | Age: 80
End: 2020-06-24

## 2020-06-24 VITALS
SYSTOLIC BLOOD PRESSURE: 138 MMHG | HEIGHT: 62 IN | DIASTOLIC BLOOD PRESSURE: 70 MMHG | HEART RATE: 64 BPM | WEIGHT: 168 LBS | OXYGEN SATURATION: 97 % | BODY MASS INDEX: 30.91 KG/M2

## 2020-06-24 DIAGNOSIS — E78.5 HYPERLIPIDEMIA LDL GOAL <100: ICD-10-CM

## 2020-06-24 DIAGNOSIS — I10 ESSENTIAL HYPERTENSION: Primary | ICD-10-CM

## 2020-06-24 DIAGNOSIS — R01.1 HEART MURMUR: ICD-10-CM

## 2020-06-24 PROCEDURE — 99213 OFFICE O/P EST LOW 20 MIN: CPT | Performed by: INTERNAL MEDICINE

## 2020-06-24 NOTE — PROGRESS NOTES
"[facility] Cardiology    Patient ID: Charo Garcia is a 80 y.o. female.  : 1940   Contact: [phone]    Encounter date: 2020    PCP: LEVON Shaw MD      Chief complaint:   Chief Complaint   Patient presents with   • Hypertension       Problem List:  1. Exertional Dyspnea  a. Cardiolite GXT, 03/10/2015: Borderline ischemic EKG changes and transient chest \"tightness\" at peak exercise and occasional atrial ectopy with isolated systolic and systemic arterial hypertension at rest and with exercise with acceptable HR decrement post exercise to 101% of MPHR and only 82% of predicted exercise capacity. No myocardial ischemia/scar by scintigraphic criteria. EF 72%.   2. Murmur/aortic sclerosis:  a. Echo, 10/13/2016: EF 60%. Mild AI. AV sclerosis, no stenosis. Mild MR. RVSP 22.6 mmHg.   b. Echo, 2019: EF 60%. Mild-to-moderate AI. AV sclerosis, no stenosis, mean gradient 7.2 mmHg. RVSP 28 mmHg.  3. Bradycardia  4. Essential Hypertension  5. Hyperlipidemia. LDL goal < 100  6. Lower extremity edema  7. GERD without esophagitis  8. Surgeries  a. Right cataract excision    Allergies   Allergen Reactions   • Aspirin Hives     PT stated lips swelled       Current Medications:    Current Outpatient Medications:   •  aluminum hydroxide-mag carbonate (Gaviscon Extra Strength) 160-105 MG chewable tablet chewable tablet, Chew 1 tablet As Needed., Disp: , Rfl:   •  atorvastatin (LIPITOR) 20 MG tablet, Take 20 mg by mouth Daily., Disp: , Rfl:   •  calcium carbonate (OS-SHARONA) 600 MG tablet, Take 600 mg by mouth 2 (Two) Times a Day., Disp: , Rfl:   •  clobetasol (TEMOVATE) 0.05 % cream, Apply 1 application topically 1 (One) Time Per Week., Disp: , Rfl:   •  conjugated estrogens (PREMARIN) 0.625 MG/GM vaginal cream, Insert  into the vagina 2 (Two) Times a Week., Disp: , Rfl:   •  pantoprazole (PROTONIX) 40 MG EC tablet, Take 40 mg by mouth Daily., Disp: , Rfl:   •  polyethylene glycol " "(MIRALAX) packet, Take 17 g by mouth Daily. 1/2 capful, Disp: , Rfl:   •  talc (ZEASORB) powder, Apply  topically As Needed for irritation., Disp: , Rfl:   •  triamterene-hydrochlorothiazide (MAXZIDE-25) 37.5-25 MG per tablet, Take 1 tablet by mouth Daily., Disp: 90 tablet, Rfl: 1  •  verapamil SR (CALAN-SR) 120 MG CR tablet, Take 120 mg by mouth 2 (Two) Times a Day., Disp: , Rfl:     HPI    Charo Garcia is a 80 y.o. female who presents today for an annual follow up of hypertension, hyperlipidemia, and dyspnea. Since last visit, she has been feeling well overall from a cardiovascular standpoint. Patient feels that verapamil 120 mg BID has been effective in controlling her blood pressure. Patient does not have an aerobic exercise routine. She does some exercises for her joints, but has not been walking. This is mostly due to fear of getting coronavirus. Patient does not leave her house unless absolutely necessary. Patient denies chest pain, shortness of breath, palpitations, edema, dizziness, and syncope.        The following portions of the patient's history were reviewed and updated as appropriate: allergies, current medications and problem list.    Pertinent positives as listed in the HPI.  All other systems reviewed are negative.         Vitals:    06/24/20 1322   BP: 138/70   BP Location: Left arm   Patient Position: Sitting   Pulse: 64   SpO2: 97%   Weight: 76.2 kg (168 lb)   Height: 157.5 cm (62\")       Physical Exam:  General: Alert and oriented.  Neck: Jugular venous pressure is within normal limits. Carotids have normal upstrokes without bruits.   Cardiovascular: Heart has a nondisplaced focal PMI. Regular rate and rhythm. No gallop or rub. 1/6 BETTY RUSB.  Lungs: Clear, no rales or wheezes. Equal expansion is noted.   Extremities: Show no edema.  Skin: Warm and dry.  Neurologic: Nonfocal.     Diagnostic Data (reviewed with patient):    Lab date: 09/15/2019  • FLP: , TG 73, HDL 55.7, LDL 64  • CMP: Glu " 87, BUN 19, Creat 1.04, eGFR 51, Na 139, K 4.7, Cl 104, CO2 30, Ca 10.3, Alk Phos 66, AST 12, ALT 12  • CBC: WBC 7.9, RBC 4.20, HGB 11.7, HCT 37.7, MCV 90, MCH 27.9,          Assessment:    ICD-10-CM ICD-9-CM   1. Essential hypertension I10 401.9   2. Hyperlipidemia LDL goal <100 E78.5 272.4   3. Heart murmur R01.1 785.2        Reviewed echocardiogram from July 2019 with the patient. Patient was reassured that, despite her heart murmur, she does not have any significant valvular stenosis.       Plan:  1. Begin routine aerobic exercise, goal 30 minutes 4 days per week.   2. Stable cardiac status overall.   3. Continue verapamil and Maxzide for hypertension and fluid retention.  4. Continue atorvastatin 20 mg for hyperlipidemia.  5. Continue all other current medications.  6. F/up in 6 months, sooner if needed.      Scribed for Sarah Chase MD by Lucero Kennedy. 6/24/2020  13:32     I Sarah Chase MD personally performed the services described in this documentation as scribed by the above individual in my presence, and it is both accurate and complete.    Sarah Chase MD, FACC

## 2020-09-17 ENCOUNTER — TRANSCRIBE ORDERS (OUTPATIENT)
Dept: ADMINISTRATIVE | Facility: HOSPITAL | Age: 80
End: 2020-09-17

## 2020-09-17 DIAGNOSIS — Z12.31 ENCOUNTER FOR SCREENING MAMMOGRAM FOR MALIGNANT NEOPLASM OF BREAST: Primary | ICD-10-CM

## 2020-10-05 ENCOUNTER — HOSPITAL ENCOUNTER (OUTPATIENT)
Dept: MAMMOGRAPHY | Facility: HOSPITAL | Age: 80
Discharge: HOME | End: 2020-10-05
Admitting: OBSTETRICS & GYNECOLOGY

## 2020-10-05 DIAGNOSIS — Z12.31 ENCOUNTER FOR SCREENING MAMMOGRAM FOR MALIGNANT NEOPLASM OF BREAST: ICD-10-CM

## 2020-10-05 PROCEDURE — 77063 BREAST TOMOSYNTHESIS BI: CPT | Performed by: RADIOLOGY

## 2020-10-05 PROCEDURE — 77067 SCR MAMMO BI INCL CAD: CPT | Performed by: RADIOLOGY

## 2020-10-05 PROCEDURE — 77063 BREAST TOMOSYNTHESIS BI: CPT

## 2020-10-05 PROCEDURE — 77067 SCR MAMMO BI INCL CAD: CPT

## 2021-02-03 ENCOUNTER — OFFICE VISIT (OUTPATIENT)
Dept: CARDIOLOGY | Facility: CLINIC | Age: 81
End: 2021-02-03

## 2021-02-03 VITALS
DIASTOLIC BLOOD PRESSURE: 60 MMHG | SYSTOLIC BLOOD PRESSURE: 128 MMHG | WEIGHT: 156.2 LBS | HEART RATE: 71 BPM | TEMPERATURE: 98.6 F | HEIGHT: 62 IN | OXYGEN SATURATION: 98 % | BODY MASS INDEX: 28.74 KG/M2

## 2021-02-03 DIAGNOSIS — I10 ESSENTIAL HYPERTENSION: ICD-10-CM

## 2021-02-03 DIAGNOSIS — R01.1 HEART MURMUR: Primary | ICD-10-CM

## 2021-02-03 DIAGNOSIS — E78.5 HYPERLIPIDEMIA LDL GOAL <100: ICD-10-CM

## 2021-02-03 PROCEDURE — 99214 OFFICE O/P EST MOD 30 MIN: CPT | Performed by: INTERNAL MEDICINE

## 2021-02-03 NOTE — PROGRESS NOTES
"[facility] Cardiology    Patient ID: Charo Garcia is a 80 y.o. female.  : 1940   Contact: [phone]    Encounter date: 2021    PCP: LEVON Shaw MD      Chief complaint:   Chief Complaint   Patient presents with   • Hypertension   • Hyperlipidemia       Problem List:  1. Exertional Dyspnea  a. Cardiolite GXT, 03/10/2015: Borderline ischemic EKG changes and transient chest \"tightness\" at peak exercise and occasional atrial ectopy with isolated systolic and systemic arterial hypertension at rest and with exercise with acceptable HR decrement post exercise to 101% of MPHR and only 82% of predicted exercise capacity. No myocardial ischemia/scar by scintigraphic criteria. EF 72%.   2. Murmur/aortic sclerosis:  a. Echo, 10/13/2016: EF 60%. Mild AI. AV sclerosis, no stenosis. Mild MR. RVSP 22.6 mmHg.   b. Echo, 2019: EF 60%. Mild-to-moderate AI. AV sclerosis, no stenosis, mean gradient 7.2 mmHg. RVSP 28 mmHg.  3. Bradycardia  4. Essential Hypertension  5. Hyperlipidemia. LDL goal < 100  6. Lower extremity edema  7. GERD without esophagitis  8. Surgeries  a. Right cataract excision    Allergies   Allergen Reactions   • Aspirin Hives     PT stated lips swelled       Current Medications:    Current Outpatient Medications:   •  aluminum hydroxide-mag carbonate (Gaviscon Extra Strength) 160-105 MG chewable tablet chewable tablet, Chew 1 tablet As Needed., Disp: , Rfl:   •  atorvastatin (LIPITOR) 20 MG tablet, Take 20 mg by mouth Daily., Disp: , Rfl:   •  clobetasol (TEMOVATE) 0.05 % cream, Apply 1 application topically 1 (One) Time Per Week., Disp: , Rfl:   •  conjugated estrogens (PREMARIN) 0.625 MG/GM vaginal cream, Insert  into the vagina 2 (Two) Times a Week., Disp: , Rfl:   •  pantoprazole (PROTONIX) 40 MG EC tablet, Take 40 mg by mouth Daily., Disp: , Rfl:   •  polyethylene glycol (MIRALAX) packet, Take 17 g by mouth Daily. 1/2 capful, Disp: , Rfl:   •  talc " "(ZEASORB) powder, Apply  topically As Needed for irritation., Disp: , Rfl:   •  triamterene-hydrochlorothiazide (MAXZIDE-25) 37.5-25 MG per tablet, Take 1 tablet by mouth Daily., Disp: 90 tablet, Rfl: 1  •  verapamil SR (CALAN-SR) 120 MG CR tablet, Take 120 mg by mouth 2 (Two) Times a Day., Disp: , Rfl:   •  calcium carbonate (OS-SHARONA) 600 MG tablet, Take 600 mg by mouth 2 (Two) Times a Day., Disp: , Rfl:     HPI    Charo Garcia is a 80 y.o. female who presents today for a 6 month follow up of heart murmur and cardiac risk factors. Since last visit, she has been feeling well overall from a cardiovascular standpoint. She has not experienced any chest pain since last visit. She has a two story house and she walks up and down the stairs frequently. She is also a part of an exercise program through which she learns new exercises. She has lost twelve pounds since last visit. She notes that she breathes fast when she exercises but she never feels short of breath. She has rarely experienced swelling since she started taking Maxzide-25. She does not monitor her blood pressure frequently but her systolic pressure normally runs in the 130's. Patient otherwise denies PND, palpitations, syncope, or presyncope at this time.    The following portions of the patient's history were reviewed and updated as appropriate: allergies, current medications and problem list.    Pertinent positives as listed in the HPI.  All other systems reviewed are negative.         Vitals:    02/03/21 1323 02/03/21 1336   BP: 110/60 128/60   BP Location: Left arm Left arm   Patient Position: Sitting Sitting   Pulse: 71    Temp: 98.6 °F (37 °C)    SpO2: 98%    Weight: 70.9 kg (156 lb 3.2 oz)    Height: 157.5 cm (62.01\")        Physical Exam:  General: Alert and oriented.  Neck: Jugular venous pressure is within normal limits. Carotids have normal upstrokes without bruits.   Cardiovascular: Heart has a nondisplaced focal PMI. Regular rate and rhythm. Trace " SE murmur, no gallop or rub.  Lungs: Clear, no rales or wheezes. Equal expansion is noted.   Extremities: Trace edema.  Skin: Warm and dry.  Neurologic: Nonfocal.     Diagnostic Data (reviewed with patient):  Lab date: 11/25/2020  • FLP: , TG 65, HDL 54.9, LDL 78  • CMP: Glu 82, BUN 31, Creat 1.22, eGFR 42, Na 141, K 4.2, Cl 104, CO2 29, Ca 11, Alk Phos 64, AST 12, ALT 11  • CBC: WBC 8.2, RBC 4.16, HGB 12.5, HCT 38.5, MCV 93, MCH 30,     Procedures      Assessment:    ICD-10-CM ICD-9-CM   1. Heart murmur  R01.1 785.2   2. Essential hypertension  I10 401.9   3. Hyperlipidemia LDL goal <100  E78.5 272.4         Plan:  1. Continue verapamil  mg BID for hypertension.  2. Continue triamterene-HCTZ 37.5-25 mg for hypertension and fluid retention.  3. Continue atorvastatin 20 mg daily for hyperlipidemia.   4. Continue all other current medications.  5. F/up in 12 months, sooner if needed.    Scribed for Sarah Chase MD by Lora Arvizu. 2/3/2021  13:36 EST      I Sarah Chase MD personally performed the services described in this documentation as scribed by the above individual in my presence, and it is both accurate and complete.    Sarah Chase MD, [facility]

## 2021-03-10 RX ORDER — TRIAMTERENE AND HYDROCHLOROTHIAZIDE 37.5; 25 MG/1; MG/1
TABLET ORAL
Qty: 90 TABLET | Refills: 2 | Status: SHIPPED | OUTPATIENT
Start: 2021-03-10